# Patient Record
Sex: MALE | Race: WHITE | NOT HISPANIC OR LATINO | URBAN - METROPOLITAN AREA
[De-identification: names, ages, dates, MRNs, and addresses within clinical notes are randomized per-mention and may not be internally consistent; named-entity substitution may affect disease eponyms.]

---

## 2020-10-21 ENCOUNTER — APPOINTMENT (OUTPATIENT)
Dept: URBAN - METROPOLITAN AREA SURGERY 18 | Age: 40
Setting detail: DERMATOLOGY
End: 2020-10-26

## 2020-10-21 DIAGNOSIS — Z71.89 OTHER SPECIFIED COUNSELING: ICD-10-CM

## 2020-10-21 DIAGNOSIS — D18.0 HEMANGIOMA: ICD-10-CM

## 2020-10-21 DIAGNOSIS — D22 MELANOCYTIC NEVI: ICD-10-CM

## 2020-10-21 DIAGNOSIS — L81.4 OTHER MELANIN HYPERPIGMENTATION: ICD-10-CM

## 2020-10-21 DIAGNOSIS — L82.1 OTHER SEBORRHEIC KERATOSIS: ICD-10-CM

## 2020-10-21 DIAGNOSIS — L57.0 ACTINIC KERATOSIS: ICD-10-CM

## 2020-10-21 PROBLEM — D22.5 MELANOCYTIC NEVI OF TRUNK: Status: ACTIVE | Noted: 2020-10-21

## 2020-10-21 PROBLEM — D22.61 MELANOCYTIC NEVI OF RIGHT UPPER LIMB, INCLUDING SHOULDER: Status: ACTIVE | Noted: 2020-10-21

## 2020-10-21 PROBLEM — D18.01 HEMANGIOMA OF SKIN AND SUBCUTANEOUS TISSUE: Status: ACTIVE | Noted: 2020-10-21

## 2020-10-21 PROBLEM — D22.62 MELANOCYTIC NEVI OF LEFT UPPER LIMB, INCLUDING SHOULDER: Status: ACTIVE | Noted: 2020-10-21

## 2020-10-21 PROBLEM — D22.71 MELANOCYTIC NEVI OF RIGHT LOWER LIMB, INCLUDING HIP: Status: ACTIVE | Noted: 2020-10-21

## 2020-10-21 PROBLEM — D22.72 MELANOCYTIC NEVI OF LEFT LOWER LIMB, INCLUDING HIP: Status: ACTIVE | Noted: 2020-10-21

## 2020-10-21 PROCEDURE — OTHER COUNSELING: OTHER

## 2020-10-21 PROCEDURE — 99203 OFFICE O/P NEW LOW 30 MIN: CPT | Mod: 25

## 2020-10-21 PROCEDURE — OTHER LIQUID NITROGEN: OTHER

## 2020-10-21 PROCEDURE — 17000 DESTRUCT PREMALG LESION: CPT

## 2020-10-21 ASSESSMENT — LOCATION DETAILED DESCRIPTION DERM
LOCATION DETAILED: LEFT ANTERIOR PROXIMAL UPPER ARM
LOCATION DETAILED: RIGHT ANTERIOR SHOULDER
LOCATION DETAILED: RIGHT SUPERIOR UPPER BACK
LOCATION DETAILED: RIGHT ANTERIOR PROXIMAL UPPER ARM
LOCATION DETAILED: SUPERIOR THORACIC SPINE
LOCATION DETAILED: LEFT ANTERIOR DISTAL UPPER ARM
LOCATION DETAILED: RIGHT ANTERIOR DISTAL UPPER ARM
LOCATION DETAILED: RIGHT ANTERIOR PROXIMAL THIGH
LOCATION DETAILED: LEFT ANTERIOR PROXIMAL THIGH
LOCATION DETAILED: RIGHT MEDIAL UPPER BACK
LOCATION DETAILED: RIGHT ANTERIOR DISTAL THIGH
LOCATION DETAILED: EPIGASTRIC SKIN
LOCATION DETAILED: LEFT ANTERIOR SHOULDER
LOCATION DETAILED: LEFT ANTERIOR DISTAL THIGH
LOCATION DETAILED: PERIUMBILICAL SKIN
LOCATION DETAILED: INFERIOR THORACIC SPINE
LOCATION DETAILED: LEFT MEDIAL MALAR CHEEK
LOCATION DETAILED: LEFT INFERIOR MEDIAL FOREHEAD

## 2020-10-21 ASSESSMENT — LOCATION SIMPLE DESCRIPTION DERM
LOCATION SIMPLE: ABDOMEN
LOCATION SIMPLE: LEFT CHEEK
LOCATION SIMPLE: LEFT FOREHEAD
LOCATION SIMPLE: RIGHT THIGH
LOCATION SIMPLE: RIGHT UPPER BACK
LOCATION SIMPLE: LEFT THIGH
LOCATION SIMPLE: RIGHT SHOULDER
LOCATION SIMPLE: UPPER BACK
LOCATION SIMPLE: RIGHT UPPER ARM
LOCATION SIMPLE: LEFT SHOULDER
LOCATION SIMPLE: LEFT UPPER ARM

## 2020-10-21 ASSESSMENT — LOCATION ZONE DERM
LOCATION ZONE: TRUNK
LOCATION ZONE: ARM
LOCATION ZONE: FACE
LOCATION ZONE: LEG

## 2020-10-21 NOTE — PROCEDURE: LIQUID NITROGEN
After Visit Summary   8/22/2018    John Yung    MRN: [id number]           Patient Information     Date Of Birth          2003        Visit Information        Provider Department      8/22/2018 9:30 AM Elizabeth Reynoso CNP [facility]        Today's Diagnoses     Contact dermatitis due to poison ivy    -  1      Care Instructions    ASSESSMENT/PLAN:     1. Contact dermatitis due to poison ivy  Acute, symptomatic  - predniSONE (DELTASONE) 20 MG tablet; Take 1 tablet (20 mg) by mouth daily for 5 days  Dispense: 5 tablet; Refill: 0 Take with food in stomach. This will help decrease immune response to poison ivy.    - triamcinolone (KENALOG) 0.1 % cream; Apply topically 2 times daily Apply to rash that is not open  Dispense: 15 g; Refill: 1    - cephALEXin (KEFLEX) 500 MG capsule; Take 1 capsule (500 mg) by mouth 4 times daily  Dispense: 40 capsule; Refill: 0 This is for signs of possible secondary infection on left second toe.    - Take entire course of antibiotic even if you start to feel better.  - Antibiotics can cause stomach upset including nausea and diarrhea. Read your bottle or ask the pharmacist if antibiotic can be taken with food to help prevent nausea. If you have symptoms of diarrhea you can take an over-the-counter probiotic and/or increase foods with probiotics such as yogurt, Jorge, sauerkraut.      FOLLOW UP: If not improving or if worsening    Elizabeth Reynoso CNP       Poison Ivy Rash  You have a rash and itching. This is a delayed reaction to the oils of the poison ivy plant. You likely came in contact with it during the 3 days before your symptoms began. Your skin will become red and itchy. Small blisters may appear. These can break and leak a clear yellow fluid. This fluid is not contagious. The reaction usually starts to go away after 1 to 2 weeks. But it may take 4 to 6 weeks to fully clear.    Home care  Follow these guidelines when caring for 
yourself at home:    The plant oils still on your skin or clothes can be spread to other places on your body. They can also be passed on to other people and cause a similar reaction. That s why it s important to wash all of the plant oils off your skin and any clothes that may have been exposed. Wash all clothes that you were wearing. Use hot water with ordinary laundry detergent.    Don't use over-the-counter creams that have neomycin or bacitracin. These may make the rash worse.    Stay away from anything that heats up your skin. This includes hot showers or baths, or direct sunlight. These can make itching worse.    Put a cold compress on areas that are leaking (weeping), or on blistered areas. Do this for 30 minutes 3 times a day. To make a cold compress, dip a wash cloth in a mixture of 1 pint of cold water and 1 packet of astringent or oatmeal bath powder. Keep the solution in the refrigerator for future use.    If large areas of skin are affected, take a lukewarm bath. Add colloidal oatmeal, or 1 cup of cornstarch or baking soda to the water.    For a rash in a smaller area, use hydrocortisone cream for redness and irritation. But don t use this if another medicine was prescribed. For severe itching, put an ice pack on the area. To make an ice pack, put ice cubes in a plastic bag that seals at the top. Wrap the bag in a clean, thin towel or cloth. Never put ice or an ice pack directly on the skin. Over-the-counter products that have calamine lotion may also be helpful.    You can also use an oral antihistamine medicine with diphenhydramine for itching, unless another medicine was prescribed. This medicine may make you sleepy. So use lower doses during the daytime and higher doses at bedtime. Don t use medicine that has diphenhydramine if you have glaucoma. Also don t use it if you are a man who has trouble urinating because of an enlarged prostate. Antihistamines with loratidine cause less drowsiness. They 
are a good choice for daytime use.    For severe cases, your provider may prescribe oral steroid medicines. Always take these exactly as prescribed.  Follow-up care  Follow up with your healthcare provider, or as directed. Call your provider if your rash gets worse or you are not starting to get better after 1 week of treatment.  When to seek medical advice  Call your healthcare provider right away if any of these occur:    Spreading facial rash with swollen mouth or eyelids    Rash that spreads to the groin and causes swelling of the penis, scrotum, or vaginal area    Trouble urinating because of swelling in the genital area  Also call your provider if you have signs of infection in the areas of broken blisters:    Spreading redness    Pus or fluid draining from the blisters    Yellow-brown crusts form over the open blisters    Fever of 1 degree, or higher, above your normal temperature, or as directed by your provider  Call 911  Call 911 if you have severe swelling on your face, eyelids, mouth, throat, or tongue.  Date Last Reviewed: 8/1/2016 2000-2017 [employer]. [address] [place] [address]. All rights reserved. This information is not intended as a substitute for professional medical care. Always follow your healthcare professional's instructions.        Managing a Poison Ivy, Poison Oak, or Poison Sumac Reaction  If you come in contact with urushiol    If you think you may have come in contact with the sap oil (called urushiol) contained in poison ivy, poison oak, or poison sumac plants, wash the affected part of your skin. Do this within 15 minutes after contact. Use water or preferably, soap and water.  Undress, and wash your clothes and gear as soon as you can. Be sure to wash any pet that was with you. Taking these steps can help prevent spreading sap oil to someone else. If you have a rash, but are not sure if it is from one of these plants, see your healthcare provider.  To 
soothe the itching  Your skin may react to poison oak, poison ivy, and poison sumac within hours to a few days after contact. Once you have come into contact with these plants, you can t stop the reaction. But you can take these steps to soothe the itching:    Don t scratch or scrub your rash. Not even if the itching is severe. Scratching can lead to infection.    Bathe in lukewarm (not hot) water. Or take short cool showers to relieve the itching. For a more soothing bath, add oatmeal to the water.    Use antihistamines that are taken by mouth. These include diphenhydramine. You can buy these at the pharmacy. Talk to your healthcare provider or pharmacist for more information on oral antihistamines.    Use over-the-counter treatments on your skin. These include cortisone and calamine lotion.  How your skin may react  A mild rash may become red, swollen, and itchy. The rash may form a line on your skin where you brushed against the plant. If you have a severe rash, your itching may worsen. And your rash may blister and ooze. If this happens, seek medical care. The fluid from your blisters will not make your rash spread. With or without medical care, your rash may last up to 3 weeks. In the future, try to avoid coming in contact with these plants.  When to call your healthcare provider  Call your healthcare provider if:    Your rash is severe    The rash spreads beyond the exposed part of your body or affects your face.    The rash does not clear up within a few weeks  You may be given medicine to take by mouth or apply directly on the skin.  Call 911  Call 911 if you have any of the following:    Trouble breathing or swallowing    Any significant swelling  Date Last Reviewed: 3/1/2017    9960-0067 [employer]. [address] [place] [address], [place] [address]. All rights reserved. This information is not intended as a substitute for professional medical care. Always follow your healthcare professional's 
instructions.                Follow-ups after your visit        Who to contact     If you have questions or need follow up information about today's clinic visit or your schedule please contact [facility] directly at [phone].  Normal or non-critical lab and imaging results will be communicated to you by [employer], letter or phone within 4 business days after the clinic has received the results. If you do not hear from us within 7 days, please contact the clinic through [employer] or phone. If you have a critical or abnormal lab result, we will notify you by phone as soon as possible.  Submit refill requests through [employer] or call your pharmacy and they will forward the refill request to us. Please allow 3 business days for your refill to be completed.          Additional Information About Your Visit        [employer] Information     [employer] lets you send messages to your doctor, view your test results, renew your prescriptions, schedule appointments and more. To sign up, go to [email or web address], contact your [place] clinic or call [phone] during business hours.            Care EveryWhere ID     This is your Care EveryWhere ID. This could be used by other organizations to access your [place] medical records  [id number]        Your Vitals Were     Pulse                   76            Blood Pressure from Last 3 Encounters:   08/22/18 122/80   01/17/18 136/90   06/19/17 130/90    Weight from Last 3 Encounters:   08/22/18 (!) 356 lb (161.5 kg) (>99 %)*   01/17/18 (!) 349 lb (158.3 kg) (>99 %)*   06/19/17 (!) 341 lb (154.7 kg) (>99 %)*     * Growth percentiles are based on CDC 2-20 Years data.              Today, you had the following     No orders found for display         Today's Medication Changes          These changes are accurate as of 8/22/18  9:46 AM.  If you have any questions, ask your nurse or doctor.               Start taking these medicines.        Dose/Directions    
cephALEXin 500 MG capsule   Commonly known as:  KEFLEX   Used for:  Contact dermatitis due to poison ivy   Started by:  Elizabeth Reynoso CNP        Dose:  500 mg   Take 1 capsule (500 mg) by mouth 4 times daily   Quantity:  40 capsule   Refills:  0       predniSONE 20 MG tablet   Commonly known as:  DELTASONE   Used for:  Contact dermatitis due to poison ivy   Started by:  Elizabeth Reynoso CNP        Dose:  20 mg   Take 1 tablet (20 mg) by mouth daily for 5 days   Quantity:  5 tablet   Refills:  0       triamcinolone 0.1 % cream   Commonly known as:  KENALOG   Used for:  Contact dermatitis due to poison ivy   Started by:  Elizabeth Reynoso CNP        Apply topically 2 times daily Apply to rash that is not open   Quantity:  15 g   Refills:  1            Where to get your medicines      These medications were sent to [id number] - [place], [place] - [address], [facility] [address]     Phone:  [phone]     cephALEXin 500 MG capsule    predniSONE 20 MG tablet    triamcinolone 0.1 % cream                Primary Care Provider Office Phone # Fax #    Kate Maldonado [phone] [address] [facility] [address]        Equal Access to Services     NASRIN ABARCA AH: Hadii veronica ku hadasho Soomaali, waaxda luqadaha, qaybta kaalmada adeegyada, waxay mita haylukaszn rito grimm. So [facility] [phone].    ATENCIÓN: Si habla español, tiene a mccallum disposición servicios gratuitos de asistencia lingüística. Llame al [phone].    We comply with applicable federal civil rights laws and [place] laws. We do not discriminate on the basis of race, color, national origin, age, disability, sex, sexual orientation, or gender identity.            Thank you!     Thank you for choosing [facility]  for your care. Our goal is always to provide you with excellent care. Hearing back from our patients is one way we 
can continue to improve our services. Please take a few minutes to complete the written survey that you may receive in the mail after your visit with us. Thank you!             Your Updated Medication List - Protect others around you: Learn how to safely use, store and throw away your medicines at www.disposemymeds.org.          This list is accurate as of 8/22/18  9:46 AM.  Always use your most recent med list.                   Brand Name Dispense Instructions for use Diagnosis    acetaminophen 325 MG tablet    TYLENOL    30 tablet    Take 2 tablets (650 mg) by mouth every 6 hours as needed for mild pain    Post-operative state       cephALEXin 500 MG capsule    KEFLEX    40 capsule    Take 1 capsule (500 mg) by mouth 4 times daily    Contact dermatitis due to poison ivy       cetirizine 10 MG tablet    zyrTEC    90 tablet    Take 1 tablet (10 mg) by mouth every evening    Seasonal allergic rhinitis       EPIPEN 2-BRADLEY 0.3 MG/0.3ML injection 2-pack   Generic drug:  EPINEPHrine     2 each    INJECT 0.3ML INTO THE MUSCLE ONCE AS NEEDED    Perennial allergic rhinitis       fluticasone 50 MCG/ACT spray    FLONASE    1 Package    Spray 2 sprays into both nostrils daily    Perennial allergic rhinitis       ibuprofen 800 MG tablet    ADVIL/MOTRIN    30 tablet    Take 1 tablet (800 mg) by mouth every 8 hours as needed for moderate pain    Post-operative state       montelukast 10 MG tablet    SINGULAIR     Take 10 mg by mouth At Bedtime        predniSONE 20 MG tablet    DELTASONE    5 tablet    Take 1 tablet (20 mg) by mouth daily for 5 days    Contact dermatitis due to poison ivy       rizatriptan 5 MG tablet    MAXALT     Take 5 mg by mouth Take 1 tablet by mouth every 2 hours if needed for Migraine. Max Dose: 30mg per 24hrs.        sodium chloride 0.65 % nasal spray    OCEAN    1 Bottle    Spray 2 sprays in nostril 4 times daily    Post-operative state       SUBLINGUAL IMMUNOTHERAPY (SLIT)     1 Bottle    Change SLIT 
per build-up protocol as noted below:   Mix:  Thistly, cottonwood, aspen, molds (omit mucor and grass smut)          cat, dust, feather, (omit horse).  Decrease SLIT dose to : 1 drop under tongue daily x 1 week. Then increase to 1 drop under tongue BID x 2 weeks. Then, increase to 1 drop under tongue TID.    Allergic rhinitis       triamcinolone 0.1 % cream    KENALOG    15 g    Apply topically 2 times daily Apply to rash that is not open    Contact dermatitis due to poison ivy       * VENTOLIN  (90 Base) MCG/ACT inhaler   Generic drug:  albuterol      Inhale 2 puffs into the lungs. Every 4-6 hours as needed        * albuterol (2.5 MG/3ML) 0.083% neb solution      Inhale 2.5 mg into the lungs every 4 hours as needed        * albuterol 108 (90 Base) MCG/ACT inhaler    PROAIR HFA/PROVENTIL HFA/VENTOLIN HFA     Inhale 2 puffs into the lungs 4 times daily as needed        * Notice:  This list has 3 medication(s) that are the same as other medications prescribed for you. Read the directions carefully, and ask your doctor or other care provider to review them with you.      
Render Note In Bullet Format When Appropriate: No
Number Of Freeze-Thaw Cycles: 1 freeze-thaw cycle
Consent: The patient's consent was obtained and the risks were discussed including but not limited to risks of crusting, scabbing, blistering, scarring, darker or lighter pigmentary change, recurrence, incomplete removal and infection prior to the procedure.
Post-Care Instructions: I reviewed with the patient in detail post-care instructions. Patient is to wear sunprotection, and avoid picking at any of the treated lesions. Pt may apply Vaseline to crusted or scabbing areas.
Duration Of Freeze Thaw-Cycle (Seconds): 0
Detail Level: Simple